# Patient Record
Sex: FEMALE | Race: WHITE | HISPANIC OR LATINO | URBAN - METROPOLITAN AREA
[De-identification: names, ages, dates, MRNs, and addresses within clinical notes are randomized per-mention and may not be internally consistent; named-entity substitution may affect disease eponyms.]

---

## 2019-02-21 ENCOUNTER — APPOINTMENT (OUTPATIENT)
Age: 44
Setting detail: DERMATOLOGY
End: 2019-02-21

## 2019-02-21 DIAGNOSIS — F42.4 EXCORIATION (SKIN-PICKING) DISORDER: ICD-10-CM

## 2019-02-21 DIAGNOSIS — L29.8 OTHER PRURITUS: ICD-10-CM

## 2019-02-21 DIAGNOSIS — L85.3 XEROSIS CUTIS: ICD-10-CM

## 2019-02-21 DIAGNOSIS — L20.89 OTHER ATOPIC DERMATITIS: ICD-10-CM

## 2019-02-21 PROBLEM — S10.80XA UNSPECIFIED SUPERFICIAL INJURY OF OTHER SPECIFIED PART OF NECK, INITIAL ENCOUNTER: Status: ACTIVE | Noted: 2019-02-21

## 2019-02-21 PROBLEM — T14.8XXA OTHER INJURY OF UNSPECIFIED BODY REGION, INITIAL ENCOUNTER: Status: ACTIVE | Noted: 2019-02-21

## 2019-02-21 PROBLEM — S40.911A UNSPECIFIED SUPERFICIAL INJURY OF RIGHT SHOULDER, INITIAL ENCOUNTER: Status: ACTIVE | Noted: 2019-02-21

## 2019-02-21 PROBLEM — L20.84 INTRINSIC (ALLERGIC) ECZEMA: Status: ACTIVE | Noted: 2019-02-21

## 2019-02-21 PROCEDURE — OTHER TREATMENT REGIMEN: OTHER

## 2019-02-21 PROCEDURE — OTHER MIPS QUALITY: OTHER

## 2019-02-21 PROCEDURE — OTHER FOLLOW UP FOR NEXT VISIT: OTHER

## 2019-02-21 PROCEDURE — 99203 OFFICE O/P NEW LOW 30 MIN: CPT

## 2019-02-21 PROCEDURE — OTHER COUNSELING: OTHER

## 2019-02-21 PROCEDURE — OTHER PRESCRIPTION: OTHER

## 2019-02-21 RX ORDER — TRIAMCINOLONE ACETONIDE 1 MG/G
OINTMENT TOPICAL
Qty: 1 | Refills: 0

## 2019-02-21 ASSESSMENT — LOCATION SIMPLE DESCRIPTION DERM
LOCATION SIMPLE: RIGHT UPPER ARM
LOCATION SIMPLE: RIGHT SHOULDER
LOCATION SIMPLE: RIGHT ANTERIOR NECK
LOCATION SIMPLE: RIGHT HAND
LOCATION SIMPLE: LEFT HAND
LOCATION SIMPLE: LEFT FOREARM
LOCATION SIMPLE: RIGHT FOREARM
LOCATION SIMPLE: RIGHT CLAVICULAR SKIN
LOCATION SIMPLE: LEFT WRIST

## 2019-02-21 ASSESSMENT — LOCATION DETAILED DESCRIPTION DERM
LOCATION DETAILED: RIGHT CLAVICULAR SKIN
LOCATION DETAILED: RIGHT RADIAL DORSAL HAND
LOCATION DETAILED: RIGHT ANTERIOR PROXIMAL UPPER ARM
LOCATION DETAILED: RIGHT ANTERIOR SHOULDER
LOCATION DETAILED: RIGHT PROXIMAL DORSAL FOREARM
LOCATION DETAILED: RIGHT CLAVICULAR NECK
LOCATION DETAILED: LEFT DORSAL MIDDLE METACARPOPHALANGEAL JOINT
LOCATION DETAILED: LEFT DORSAL WRIST
LOCATION DETAILED: LEFT PROXIMAL DORSAL FOREARM

## 2019-02-21 ASSESSMENT — LOCATION ZONE DERM
LOCATION ZONE: HAND
LOCATION ZONE: ARM
LOCATION ZONE: NECK
LOCATION ZONE: TRUNK

## 2019-03-21 ENCOUNTER — APPOINTMENT (OUTPATIENT)
Age: 44
Setting detail: DERMATOLOGY
End: 2019-03-21

## 2019-03-21 DIAGNOSIS — L85.3 XEROSIS CUTIS: ICD-10-CM

## 2019-03-21 DIAGNOSIS — L20.89 OTHER ATOPIC DERMATITIS: ICD-10-CM

## 2019-03-21 DIAGNOSIS — F42.4 EXCORIATION (SKIN-PICKING) DISORDER: ICD-10-CM

## 2019-03-21 DIAGNOSIS — L29.8 OTHER PRURITUS: ICD-10-CM

## 2019-03-21 PROBLEM — S10.80XA UNSPECIFIED SUPERFICIAL INJURY OF OTHER SPECIFIED PART OF NECK, INITIAL ENCOUNTER: Status: ACTIVE | Noted: 2019-03-21

## 2019-03-21 PROBLEM — L20.84 INTRINSIC (ALLERGIC) ECZEMA: Status: ACTIVE | Noted: 2019-03-21

## 2019-03-21 PROBLEM — S40.911A UNSPECIFIED SUPERFICIAL INJURY OF RIGHT SHOULDER, INITIAL ENCOUNTER: Status: ACTIVE | Noted: 2019-03-21

## 2019-03-21 PROBLEM — T14.8XXA OTHER INJURY OF UNSPECIFIED BODY REGION, INITIAL ENCOUNTER: Status: ACTIVE | Noted: 2019-03-21

## 2019-03-21 PROCEDURE — OTHER MIPS QUALITY: OTHER

## 2019-03-21 PROCEDURE — OTHER COUNSELING: OTHER

## 2019-03-21 PROCEDURE — 99213 OFFICE O/P EST LOW 20 MIN: CPT

## 2019-03-21 PROCEDURE — OTHER TREATMENT REGIMEN: OTHER

## 2019-03-21 PROCEDURE — OTHER FOLLOW UP FOR NEXT VISIT: OTHER

## 2019-03-21 ASSESSMENT — LOCATION DETAILED DESCRIPTION DERM
LOCATION DETAILED: RIGHT ANTERIOR SHOULDER
LOCATION DETAILED: LEFT DORSAL MIDDLE METACARPOPHALANGEAL JOINT
LOCATION DETAILED: RIGHT CLAVICULAR SKIN
LOCATION DETAILED: RIGHT CLAVICULAR NECK
LOCATION DETAILED: RIGHT ANTERIOR PROXIMAL UPPER ARM
LOCATION DETAILED: LEFT ELBOW
LOCATION DETAILED: RIGHT RADIAL DORSAL HAND
LOCATION DETAILED: RIGHT ELBOW
LOCATION DETAILED: RIGHT PROXIMAL DORSAL FOREARM
LOCATION DETAILED: LEFT PROXIMAL DORSAL FOREARM
LOCATION DETAILED: LEFT DORSAL WRIST

## 2019-03-21 ASSESSMENT — LOCATION SIMPLE DESCRIPTION DERM
LOCATION SIMPLE: RIGHT ANTERIOR NECK
LOCATION SIMPLE: RIGHT CLAVICULAR SKIN
LOCATION SIMPLE: LEFT ELBOW
LOCATION SIMPLE: RIGHT ELBOW
LOCATION SIMPLE: RIGHT SHOULDER
LOCATION SIMPLE: RIGHT UPPER ARM
LOCATION SIMPLE: RIGHT FOREARM
LOCATION SIMPLE: LEFT WRIST
LOCATION SIMPLE: LEFT HAND
LOCATION SIMPLE: LEFT FOREARM
LOCATION SIMPLE: RIGHT HAND

## 2019-03-21 ASSESSMENT — LOCATION ZONE DERM
LOCATION ZONE: HAND
LOCATION ZONE: TRUNK
LOCATION ZONE: ARM
LOCATION ZONE: NECK

## 2019-03-21 NOTE — PROCEDURE: TREATMENT REGIMEN
Plan: Fragrance free soaps\\nLimit showers to 5 min or less, luke warm water\\nLimit unattended time in bathroom
Detail Level: Zone
Continue Regimen: Fragrance free soaps\\nLimit showers to 5 min or less, luke warm water
Initiate Treatment: Mupirocin to open sores BID until healed\\nTriamcinolone ointment to right chest and arm BID Monday, Wednesday and Friday \\nEucerin Advanced moisturizer daily
Plan: Per Pt’s sister, pt scratches consistently. Advised to keep covered 24 hrs\\nPt can use the 3M Ultra sensitive tape (does not cause irritation)\\nCloth gloves at night on top of Aquaphor
Continue Regimen: Mupirocin to all open sores BID until healed\\nTriamcinolone ointment BID, Monday, Wednesday and Friday \\nEucerin Advanced moisturizer, chest, arms and hands in AM\\nAquaphor over forearms and hands, covered with gloves nightly (hands)

## 2019-03-21 NOTE — PROCEDURE: FOLLOW UP FOR NEXT VISIT
Detail Level: Simple
Instructions (Optional): Reevaluate
Instructions (Optional): Reevaluate treatment
Scheduled For Follow Up In (Optional): 6 weeks
Scheduled For Follow Up In (Optional): 1 month

## 2019-05-02 ENCOUNTER — APPOINTMENT (OUTPATIENT)
Age: 44
Setting detail: DERMATOLOGY
End: 2019-05-02

## 2019-05-02 DIAGNOSIS — L85.3 XEROSIS CUTIS: ICD-10-CM

## 2019-05-02 DIAGNOSIS — L29.8 OTHER PRURITUS: ICD-10-CM

## 2019-05-02 DIAGNOSIS — F42.4 EXCORIATION (SKIN-PICKING) DISORDER: ICD-10-CM

## 2019-05-02 DIAGNOSIS — L20.89 OTHER ATOPIC DERMATITIS: ICD-10-CM

## 2019-05-02 PROBLEM — S40.911A UNSPECIFIED SUPERFICIAL INJURY OF RIGHT SHOULDER, INITIAL ENCOUNTER: Status: ACTIVE | Noted: 2019-05-02

## 2019-05-02 PROBLEM — L20.84 INTRINSIC (ALLERGIC) ECZEMA: Status: ACTIVE | Noted: 2019-05-02

## 2019-05-02 PROBLEM — T14.8XXA OTHER INJURY OF UNSPECIFIED BODY REGION, INITIAL ENCOUNTER: Status: ACTIVE | Noted: 2019-05-02

## 2019-05-02 PROBLEM — S10.80XA UNSPECIFIED SUPERFICIAL INJURY OF OTHER SPECIFIED PART OF NECK, INITIAL ENCOUNTER: Status: ACTIVE | Noted: 2019-05-02

## 2019-05-02 PROCEDURE — OTHER FOLLOW UP FOR NEXT VISIT: OTHER

## 2019-05-02 PROCEDURE — 99214 OFFICE O/P EST MOD 30 MIN: CPT

## 2019-05-02 PROCEDURE — OTHER MIPS QUALITY: OTHER

## 2019-05-02 PROCEDURE — OTHER COUNSELING: OTHER

## 2019-05-02 PROCEDURE — OTHER TREATMENT REGIMEN: OTHER

## 2019-05-02 ASSESSMENT — LOCATION DETAILED DESCRIPTION DERM
LOCATION DETAILED: RIGHT RADIAL DORSAL HAND
LOCATION DETAILED: LEFT ELBOW
LOCATION DETAILED: RIGHT ANTERIOR SHOULDER
LOCATION DETAILED: RIGHT CLAVICULAR SKIN
LOCATION DETAILED: RIGHT ELBOW
LOCATION DETAILED: LEFT DORSAL WRIST
LOCATION DETAILED: RIGHT ANTERIOR PROXIMAL UPPER ARM
LOCATION DETAILED: RIGHT CLAVICULAR NECK
LOCATION DETAILED: RIGHT PROXIMAL DORSAL FOREARM
LOCATION DETAILED: LEFT DORSAL MIDDLE METACARPOPHALANGEAL JOINT
LOCATION DETAILED: LEFT PROXIMAL DORSAL FOREARM

## 2019-05-02 ASSESSMENT — LOCATION SIMPLE DESCRIPTION DERM
LOCATION SIMPLE: LEFT WRIST
LOCATION SIMPLE: LEFT ELBOW
LOCATION SIMPLE: RIGHT FOREARM
LOCATION SIMPLE: RIGHT UPPER ARM
LOCATION SIMPLE: LEFT FOREARM
LOCATION SIMPLE: RIGHT SHOULDER
LOCATION SIMPLE: RIGHT CLAVICULAR SKIN
LOCATION SIMPLE: LEFT HAND
LOCATION SIMPLE: RIGHT ANTERIOR NECK
LOCATION SIMPLE: RIGHT HAND
LOCATION SIMPLE: RIGHT ELBOW

## 2019-05-02 ASSESSMENT — LOCATION ZONE DERM
LOCATION ZONE: ARM
LOCATION ZONE: NECK
LOCATION ZONE: TRUNK
LOCATION ZONE: HAND

## 2019-05-02 NOTE — PROCEDURE: TREATMENT REGIMEN
Detail Level: Zone
Continue Regimen: Mupirocin to all open sores BID until healed\\nTriamcinolone ointment BID, Monday, Wednesday and Friday \\nEucerin Advanced moisturizer, chest, arms and hands in AM\\nAquaphor over forearms and hands, covered with gloves nightly (hands)
Plan: Fragrance free soaps\\nLimit showers to 5 min or less, luke warm water\\nLimit unattended time in bathroom
Plan: Per Pt’s sister, pt scratches consistently. Advised to keep covered 24 hrs\\nPt can use the 3M Ultra sensitive tape (does not cause irritation)\\nCloth gloves at night on top of Aquaphor\\nPt has improved significantly, will plan to continue treatment
Continue Regimen: Fragrance free soaps\\nLimit showers to 5 min or less, luke warm water\\nMupirocin to open sores BID until healed\\nTriamcinolone ointment to right chest and arm BID Monday, Wednesday and Friday \\nEucerin Advanced moisturizer daily

## 2019-05-02 NOTE — PROCEDURE: FOLLOW UP FOR NEXT VISIT
Instructions (Optional): Reevaluate treatment
Scheduled For Follow Up In (Optional): 3 months
Instructions (Optional): Reevaluate
Detail Level: Simple
Scheduled For Follow Up In (Optional): 3 month

## 2019-08-01 ENCOUNTER — APPOINTMENT (OUTPATIENT)
Age: 44
Setting detail: DERMATOLOGY
End: 2019-08-01

## 2019-08-01 DIAGNOSIS — L20.89 OTHER ATOPIC DERMATITIS: ICD-10-CM

## 2019-08-01 DIAGNOSIS — L29.8 OTHER PRURITUS: ICD-10-CM

## 2019-08-01 DIAGNOSIS — F42.4 EXCORIATION (SKIN-PICKING) DISORDER: ICD-10-CM

## 2019-08-01 DIAGNOSIS — L85.3 XEROSIS CUTIS: ICD-10-CM

## 2019-08-01 PROBLEM — J45.909 UNSPECIFIED ASTHMA, UNCOMPLICATED: Status: ACTIVE | Noted: 2019-08-01

## 2019-08-01 PROBLEM — S10.80XA UNSPECIFIED SUPERFICIAL INJURY OF OTHER SPECIFIED PART OF NECK, INITIAL ENCOUNTER: Status: ACTIVE | Noted: 2019-08-01

## 2019-08-01 PROBLEM — L20.84 INTRINSIC (ALLERGIC) ECZEMA: Status: ACTIVE | Noted: 2019-08-01

## 2019-08-01 PROBLEM — S40.911A UNSPECIFIED SUPERFICIAL INJURY OF RIGHT SHOULDER, INITIAL ENCOUNTER: Status: ACTIVE | Noted: 2019-08-01

## 2019-08-01 PROBLEM — T14.8XXA OTHER INJURY OF UNSPECIFIED BODY REGION, INITIAL ENCOUNTER: Status: ACTIVE | Noted: 2019-08-01

## 2019-08-01 PROCEDURE — OTHER TREATMENT REGIMEN: OTHER

## 2019-08-01 PROCEDURE — 99213 OFFICE O/P EST LOW 20 MIN: CPT

## 2019-08-01 PROCEDURE — OTHER MIPS QUALITY: OTHER

## 2019-08-01 PROCEDURE — OTHER COUNSELING: OTHER

## 2019-08-01 PROCEDURE — OTHER FOLLOW UP FOR NEXT VISIT: OTHER

## 2019-08-01 ASSESSMENT — LOCATION DETAILED DESCRIPTION DERM
LOCATION DETAILED: RIGHT CLAVICULAR NECK
LOCATION DETAILED: LEFT DORSAL MIDDLE METACARPOPHALANGEAL JOINT
LOCATION DETAILED: RIGHT ANTERIOR PROXIMAL UPPER ARM
LOCATION DETAILED: LEFT DORSAL WRIST
LOCATION DETAILED: RIGHT ANTERIOR SHOULDER
LOCATION DETAILED: RIGHT ELBOW
LOCATION DETAILED: RIGHT PROXIMAL DORSAL FOREARM
LOCATION DETAILED: LEFT ELBOW
LOCATION DETAILED: RIGHT RADIAL DORSAL HAND
LOCATION DETAILED: RIGHT CLAVICULAR SKIN
LOCATION DETAILED: LEFT PROXIMAL DORSAL FOREARM

## 2019-08-01 ASSESSMENT — LOCATION SIMPLE DESCRIPTION DERM
LOCATION SIMPLE: RIGHT FOREARM
LOCATION SIMPLE: LEFT ELBOW
LOCATION SIMPLE: RIGHT CLAVICULAR SKIN
LOCATION SIMPLE: RIGHT ELBOW
LOCATION SIMPLE: LEFT WRIST
LOCATION SIMPLE: RIGHT ANTERIOR NECK
LOCATION SIMPLE: LEFT HAND
LOCATION SIMPLE: RIGHT SHOULDER
LOCATION SIMPLE: RIGHT HAND
LOCATION SIMPLE: LEFT FOREARM
LOCATION SIMPLE: RIGHT UPPER ARM

## 2019-08-01 ASSESSMENT — LOCATION ZONE DERM
LOCATION ZONE: ARM
LOCATION ZONE: NECK
LOCATION ZONE: TRUNK
LOCATION ZONE: HAND

## 2019-08-01 NOTE — PROCEDURE: FOLLOW UP FOR NEXT VISIT
Instructions (Optional): Reevaluate
Scheduled For Follow Up In (Optional): 6 months
Instructions (Optional): Reevaluate treatment
Detail Level: Simple

## 2019-08-01 NOTE — PROCEDURE: TREATMENT REGIMEN
Continue Regimen: Fragrance free soaps\\nLimit showers to 5 min or less, luke warm water\\nMupirocin to open sores BID until healed\\nTriamcinolone ointment to right chest and arm BID Monday, Wednesday and Friday \\nEucerin Advanced moisturizer daily
Detail Level: Zone
Plan: Per Pt’s sister, pt scratches consistently. Advised to keep covered 24 hrs\\nPt can use the 3M Ultra sensitive tape (does not cause irritation)\\nCloth gloves at night on top of Aquaphor\\nPt has improved significantly, will plan to continue treatment
Continue Regimen: Mupirocin to all open sores BID until healed\\nTriamcinolone ointment BID, Monday, Wednesday and Friday \\nEucerin Advanced moisturizer, chest, arms and hands in AM\\nAquaphor over forearms and hands, covered with gloves nightly (hands)
Plan: Fragrance free soaps\\nLimit showers to 5 min or less, luke warm water\\nLimit unattended time in bathroom

## 2019-10-28 NOTE — PROCEDURE: TREATMENT REGIMEN
Detail Level: Zone
Plan: Per Pt’s sister, pt scratches consistently. Advised to keep covered 24 hrs\\nPt can use the 3M Ultra sensitive tape (does not cause irritation)\\nCloth gloves at night on top of moisturizer
Continue Regimen: Fragrance free soaps\\nLimit showers to 5 min or less, luke warm water
Initiate Treatment: Mupirocin to open sores BID until healed\\nTriamcinolone ointment 2 weeks BID out of the month\\nEucerin Advanced moisturizer daily
43.3

## 2019-12-05 ENCOUNTER — APPOINTMENT (OUTPATIENT)
Age: 44
Setting detail: DERMATOLOGY
End: 2019-12-05

## 2019-12-05 DIAGNOSIS — L85.3 XEROSIS CUTIS: ICD-10-CM

## 2019-12-05 DIAGNOSIS — L20.89 OTHER ATOPIC DERMATITIS: ICD-10-CM

## 2019-12-05 DIAGNOSIS — L29.8 OTHER PRURITUS: ICD-10-CM

## 2019-12-05 DIAGNOSIS — L08.9 LOCAL INFECTION OF THE SKIN AND SUBCUTANEOUS TISSUE, UNSPECIFIED: ICD-10-CM

## 2019-12-05 PROBLEM — L20.84 INTRINSIC (ALLERGIC) ECZEMA: Status: ACTIVE | Noted: 2019-12-05

## 2019-12-05 PROBLEM — M12.9 ARTHROPATHY, UNSPECIFIED: Status: ACTIVE | Noted: 2019-12-05

## 2019-12-05 PROCEDURE — OTHER PRESCRIPTION: OTHER

## 2019-12-05 PROCEDURE — OTHER TREATMENT REGIMEN: OTHER

## 2019-12-05 PROCEDURE — 99213 OFFICE O/P EST LOW 20 MIN: CPT

## 2019-12-05 PROCEDURE — OTHER COUNSELING: OTHER

## 2019-12-05 PROCEDURE — OTHER MIPS QUALITY: OTHER

## 2019-12-05 RX ORDER — MUPIROCIN 20 MG/G
OINTMENT TOPICAL
Qty: 1 | Refills: 2 | COMMUNITY
Start: 2019-12-05

## 2019-12-05 ASSESSMENT — LOCATION ZONE DERM
LOCATION ZONE: HAND
LOCATION ZONE: ARM

## 2019-12-05 ASSESSMENT — LOCATION DETAILED DESCRIPTION DERM
LOCATION DETAILED: RIGHT ANTERIOR SHOULDER
LOCATION DETAILED: RIGHT RADIAL DORSAL HAND
LOCATION DETAILED: LEFT ELBOW
LOCATION DETAILED: LEFT PROXIMAL DORSAL FOREARM
LOCATION DETAILED: LEFT DORSAL MIDDLE METACARPOPHALANGEAL JOINT
LOCATION DETAILED: LEFT DORSAL WRIST
LOCATION DETAILED: RIGHT ELBOW
LOCATION DETAILED: RIGHT PROXIMAL DORSAL FOREARM
LOCATION DETAILED: RIGHT ANTERIOR PROXIMAL UPPER ARM

## 2019-12-05 ASSESSMENT — LOCATION SIMPLE DESCRIPTION DERM
LOCATION SIMPLE: RIGHT HAND
LOCATION SIMPLE: RIGHT ELBOW
LOCATION SIMPLE: LEFT ELBOW
LOCATION SIMPLE: LEFT HAND
LOCATION SIMPLE: RIGHT UPPER ARM
LOCATION SIMPLE: LEFT FOREARM
LOCATION SIMPLE: LEFT WRIST
LOCATION SIMPLE: RIGHT SHOULDER
LOCATION SIMPLE: RIGHT FOREARM

## 2019-12-05 NOTE — PROCEDURE: TREATMENT REGIMEN
Initiate Treatment: Mupirocin to right forearm twice x3 weeks, change bandage at each application. Dressing should be with non adherent pad and wrapped in coband .  Right forearm should be dressed 24 hours a day.
Detail Level: Zone
Continue Regimen: Mupirocin to all open sores BID until healed\\nTriamcinolone ointment BID, Monday, Wednesday and Friday to all UNOPENED wounds \\nEucerin Advanced moisturizer, chest, arms and hands in AM\\nAquaphor over forearms and hands, covered with gloves nightly (hands)
Plan: Fragrance free soaps\\nLimit showers to 5 min or less, luke warm water\\nLimit unattended time in bathroom
Continue Regimen: Fragrance free soaps\\nLimit showers to 5 min or less, luke warm water\\nMupirocin to open sores BID until healed\\nTriamcinolone ointment to right chest and arm BID Monday, Wednesday and Friday to UNOPENED lesions\\nEucerin Advanced moisturizer daily
Plan: Per Pt’s sister, pt scratches consistently. Advised to keep covered 24 hrs\\nPt can use the 3M Ultra sensitive tape (does not cause irritation)\\nCloth gloves at night on top of Aquaphor\\nPt has improved significantly, will plan to continue treatment

## 2020-01-16 ENCOUNTER — APPOINTMENT (OUTPATIENT)
Age: 45
Setting detail: DERMATOLOGY
End: 2020-01-16

## 2020-01-16 DIAGNOSIS — L08.9 LOCAL INFECTION OF THE SKIN AND SUBCUTANEOUS TISSUE, UNSPECIFIED: ICD-10-CM

## 2020-01-16 DIAGNOSIS — L20.89 OTHER ATOPIC DERMATITIS: ICD-10-CM

## 2020-01-16 DIAGNOSIS — L85.3 XEROSIS CUTIS: ICD-10-CM

## 2020-01-16 PROBLEM — L20.84 INTRINSIC (ALLERGIC) ECZEMA: Status: ACTIVE | Noted: 2020-01-16

## 2020-01-16 PROCEDURE — 99213 OFFICE O/P EST LOW 20 MIN: CPT

## 2020-01-16 PROCEDURE — OTHER MIPS QUALITY: OTHER

## 2020-01-16 PROCEDURE — OTHER TREATMENT REGIMEN: OTHER

## 2020-01-16 PROCEDURE — OTHER COUNSELING: OTHER

## 2020-01-16 PROCEDURE — OTHER FOLLOW UP FOR NEXT VISIT: OTHER

## 2020-01-16 ASSESSMENT — LOCATION SIMPLE DESCRIPTION DERM
LOCATION SIMPLE: RIGHT UPPER ARM
LOCATION SIMPLE: LEFT WRIST
LOCATION SIMPLE: RIGHT SHOULDER
LOCATION SIMPLE: LEFT HAND
LOCATION SIMPLE: LEFT ELBOW
LOCATION SIMPLE: RIGHT FOREARM
LOCATION SIMPLE: RIGHT HAND
LOCATION SIMPLE: LEFT FOREARM
LOCATION SIMPLE: RIGHT ELBOW

## 2020-01-16 ASSESSMENT — LOCATION DETAILED DESCRIPTION DERM
LOCATION DETAILED: LEFT DORSAL WRIST
LOCATION DETAILED: RIGHT ANTERIOR PROXIMAL UPPER ARM
LOCATION DETAILED: RIGHT PROXIMAL DORSAL FOREARM
LOCATION DETAILED: RIGHT RADIAL DORSAL HAND
LOCATION DETAILED: LEFT ELBOW
LOCATION DETAILED: RIGHT ELBOW
LOCATION DETAILED: LEFT DORSAL MIDDLE METACARPOPHALANGEAL JOINT
LOCATION DETAILED: LEFT PROXIMAL DORSAL FOREARM
LOCATION DETAILED: RIGHT ANTERIOR SHOULDER

## 2020-01-16 ASSESSMENT — LOCATION ZONE DERM
LOCATION ZONE: ARM
LOCATION ZONE: HAND

## 2020-01-16 NOTE — PROCEDURE: TREATMENT REGIMEN
Initiate Treatment: Mupirocin to right forearm twice x3 weeks, change bandage at each application. Dressing should be with non adherent pad and wrapped in coban .  Right forearm should be dressed 24 hours a day.
Plan: Fragrance free soaps\\nLimit showers to 5 min or less, luke warm water\\nLimit unattended time in bathroom\\nIf sores still present at next visit, may recommend clothing sleeves.
Continue Regimen: Fragrance free soaps\\nLimit showers to 5 min or less, luke warm water\\nMupirocin to open sores BID until healed\\nTriamcinolone ointment to right chest and arm BID Monday, Wednesday and Friday to UNOPENED lesions\\nEucerin Advanced moisturizer daily
Detail Level: Zone
Continue Regimen: * Mupirocin to all open sores BID until healed, dressing with non adhesive pad, then wrap with 2 in.    Coban. Can discontinue once wounds are healed. \\n* Triamcinolone ointment BID, Monday, Wednesday and Friday to all UNOPENED wounds\\n* Eucerin Advanced moisturizer, chest, arms and hands in AM\\n* Aquaphor over forearms and hands, covered with gloves nightly (hands)\\n\\nThe above order is maintenance therapy and is not to be discontinued without ordering provider’s consent.

## 2020-01-16 NOTE — PROCEDURE: FOLLOW UP FOR NEXT VISIT
Scheduled For Follow Up In (Optional): 4 weeks
Scheduled For Follow Up In (Optional): 1 month
Instructions (Optional): Reevaluate
Detail Level: Simple

## 2020-01-21 ENCOUNTER — RX ONLY (RX ONLY)
Age: 45
End: 2020-01-21

## 2020-01-21 RX ORDER — TRIAMCINOLONE ACETONIDE 1 MG/G
OINTMENT TOPICAL
Qty: 1 | Refills: 0 | Status: ERX

## 2020-03-02 ENCOUNTER — APPOINTMENT (OUTPATIENT)
Age: 45
Setting detail: DERMATOLOGY
End: 2020-03-02

## 2020-03-02 DIAGNOSIS — L98429 CHRONIC ULCER OF OTHER SPECIFIED SITES: ICD-10-CM

## 2020-03-02 DIAGNOSIS — L98419 CHRONIC ULCER OF OTHER SPECIFIED SITES: ICD-10-CM

## 2020-03-02 PROBLEM — L98.499 NON-PRESSURE CHRONIC ULCER OF SKIN OF OTHER SITES WITH UNSPECIFIED SEVERITY: Status: ACTIVE | Noted: 2020-03-02

## 2020-03-02 PROCEDURE — OTHER MIPS QUALITY: OTHER

## 2020-03-02 PROCEDURE — OTHER COUNSELING: OTHER

## 2020-03-02 PROCEDURE — 99213 OFFICE O/P EST LOW 20 MIN: CPT

## 2020-03-02 PROCEDURE — OTHER TREATMENT REGIMEN: OTHER

## 2020-03-02 ASSESSMENT — LOCATION SIMPLE DESCRIPTION DERM: LOCATION SIMPLE: RIGHT FOREARM

## 2020-03-02 ASSESSMENT — LOCATION ZONE DERM: LOCATION ZONE: ARM

## 2020-03-02 ASSESSMENT — LOCATION DETAILED DESCRIPTION DERM: LOCATION DETAILED: RIGHT VENTRAL PROXIMAL FOREARM

## 2020-03-02 NOTE — PROCEDURE: TREATMENT REGIMEN
Detail Level: Zone
Plan: Mupirocin to right forearm QD, change bandage at each application. Dressing should be with non adherent pad and wrapped in coban . Right forearm should be dressed 24 hours a day.

## 2020-03-11 ENCOUNTER — RX ONLY (RX ONLY)
Age: 45
End: 2020-03-11

## 2020-03-11 RX ORDER — MUPIROCIN 20 MG/G
OINTMENT TOPICAL
Qty: 1 | Refills: 2 | Status: ERX

## 2020-03-11 RX ORDER — TRIAMCINOLONE ACETONIDE 1 MG/G
OINTMENT TOPICAL
Qty: 1 | Refills: 0 | Status: ERX

## 2020-03-12 ENCOUNTER — APPOINTMENT (OUTPATIENT)
Age: 45
Setting detail: DERMATOLOGY
End: 2020-03-12

## 2020-03-12 DIAGNOSIS — L98429 CHRONIC ULCER OF OTHER SPECIFIED SITES: ICD-10-CM

## 2020-03-12 DIAGNOSIS — L98419 CHRONIC ULCER OF OTHER SPECIFIED SITES: ICD-10-CM

## 2020-03-12 PROBLEM — L98.499 NON-PRESSURE CHRONIC ULCER OF SKIN OF OTHER SITES WITH UNSPECIFIED SEVERITY: Status: ACTIVE | Noted: 2020-03-12

## 2020-03-12 PROCEDURE — OTHER COUNSELING: OTHER

## 2020-03-12 PROCEDURE — 99213 OFFICE O/P EST LOW 20 MIN: CPT

## 2020-03-12 PROCEDURE — OTHER TREATMENT REGIMEN: OTHER

## 2020-03-12 PROCEDURE — OTHER MIPS QUALITY: OTHER

## 2020-03-12 ASSESSMENT — LOCATION DETAILED DESCRIPTION DERM: LOCATION DETAILED: RIGHT VENTRAL PROXIMAL FOREARM

## 2020-03-12 ASSESSMENT — LOCATION SIMPLE DESCRIPTION DERM: LOCATION SIMPLE: RIGHT FOREARM

## 2020-03-12 ASSESSMENT — LOCATION ZONE DERM: LOCATION ZONE: ARM

## 2020-03-12 NOTE — PROCEDURE: TREATMENT REGIMEN
Detail Level: Zone
Continue Regimen: Mupirocin oint\\nApply to open area on forearm. \\n\\nStrongly recommended keeping the arm wrapped to try and avoid any picking. Neoprene sleeve over the Mupirocin covered ulcer.
Plan: Mupirocin to right forearm QD, change bandage at each application. Dressing should be with non adherent pad and wrapped in coban . Right forearm should be dressed 24 hours a day.

## 2020-03-12 NOTE — PROCEDURE: MIPS QUALITY
Detail Level: Detailed
Quality 110: Preventive Care And Screening: Influenza Immunization: Influenza Immunization Administered during Influenza season
Quality 226: Preventive Care And Screening: Tobacco Use: Screening And Cessation Intervention: Patient screened for tobacco use and is an ex/non-smoker
06-Dec-2018 11:16

## 2023-04-17 ENCOUNTER — APPOINTMENT (OUTPATIENT)
Age: 48
Setting detail: DERMATOLOGY
End: 2023-04-17

## 2023-04-17 VITALS — TEMPERATURE: 98.7 F

## 2023-04-17 DIAGNOSIS — L65.0 TELOGEN EFFLUVIUM: ICD-10-CM

## 2023-04-17 DIAGNOSIS — L85.3 XEROSIS CUTIS: ICD-10-CM

## 2023-04-17 DIAGNOSIS — L28.0 LICHEN SIMPLEX CHRONICUS: ICD-10-CM

## 2023-04-17 DIAGNOSIS — F42.4 EXCORIATION (SKIN-PICKING) DISORDER: ICD-10-CM

## 2023-04-17 PROBLEM — L65.9 NONSCARRING HAIR LOSS, UNSPECIFIED: Status: ACTIVE | Noted: 2023-04-17

## 2023-04-17 PROCEDURE — OTHER PRESCRIPTION: OTHER

## 2023-04-17 PROCEDURE — OTHER PRESCRIPTION MEDICATION MANAGEMENT: OTHER

## 2023-04-17 PROCEDURE — OTHER ADDITIONAL NOTES: OTHER

## 2023-04-17 PROCEDURE — OTHER MIPS QUALITY: OTHER

## 2023-04-17 PROCEDURE — 99213 OFFICE O/P EST LOW 20 MIN: CPT

## 2023-04-17 PROCEDURE — OTHER TREATMENT REGIMEN: OTHER

## 2023-04-17 PROCEDURE — OTHER COUNSELING: OTHER

## 2023-04-17 RX ORDER — MUPIROCIN 20 MG/G
OINTMENT TOPICAL
Qty: 44 | Refills: 0 | Status: ERX | COMMUNITY
Start: 2023-04-17

## 2023-04-17 ASSESSMENT — LOCATION SIMPLE DESCRIPTION DERM
LOCATION SIMPLE: RIGHT HAND
LOCATION SIMPLE: LEFT FOREARM
LOCATION SIMPLE: RIGHT FOREARM
LOCATION SIMPLE: LEFT HAND
LOCATION SIMPLE: ANTERIOR SCALP

## 2023-04-17 ASSESSMENT — LOCATION DETAILED DESCRIPTION DERM
LOCATION DETAILED: 2ND WEB SPACE RIGHT HAND
LOCATION DETAILED: LEFT DISTAL DORSAL FOREARM
LOCATION DETAILED: RIGHT PROXIMAL DORSAL FOREARM
LOCATION DETAILED: LEFT ULNAR DORSAL HAND
LOCATION DETAILED: RIGHT PROXIMAL RADIAL DORSAL FOREARM
LOCATION DETAILED: MID-FRONTAL SCALP

## 2023-04-17 ASSESSMENT — LOCATION ZONE DERM
LOCATION ZONE: HAND
LOCATION ZONE: ARM
LOCATION ZONE: SCALP

## 2023-04-17 NOTE — PROCEDURE: ADDITIONAL NOTES
Render Risk Assessment In Note?: no
Additional Notes: Patient's sister noticed significant thinning of the hair on scalp over the past 1-2 months, though she is unsure if patient is experiencing mostly shedding or just thinning of hair. We discussed having bloodwork drawn with PCP to check thyroid function, CBC, Vitamin D, Vitamin B12, ferritin.
Detail Level: Simple

## 2023-04-17 NOTE — PROCEDURE: PRESCRIPTION MEDICATION MANAGEMENT
Plan: Trim nails short to help prevent deep scratching
Render In Strict Bullet Format?: No
Initiate Treatment: Eucerin Advanced Repair cream daily to arms and hands; cover hands with cotton gloves at night before bed
Initiate Treatment: Mupirocin 2 % topical ointment - apply a thin layer to open wound on right arm bid x2 weeks or until healed
Detail Level: Zone

## 2023-04-17 NOTE — PROCEDURE: COUNSELING
Detail Level: Simple
Cleanser Recommendations: Dove unscented soap
Detail Level: Zone
Antihistamine Recommendations: Zyrtec/Claritin/Allegra daily
Moisturizer Recommendations: Eucerin Advanced Repair cream
Moisturizer Recommendations: Vaseline or Aquaphor BID
Antihistamine Recommendations: Benadryl

## 2023-04-17 NOTE — PROCEDURE: TREATMENT REGIMEN
Detail Level: Zone
Initiate Treatment: Daily moisturizing, triamcinolone cream to affected area BID x two weeks then PRN itching only

## 2023-05-17 ENCOUNTER — APPOINTMENT (OUTPATIENT)
Age: 48
Setting detail: DERMATOLOGY
End: 2023-05-17

## 2023-05-17 VITALS — TEMPERATURE: 98.7 F

## 2023-05-17 DIAGNOSIS — L65.0 TELOGEN EFFLUVIUM: ICD-10-CM

## 2023-05-17 DIAGNOSIS — L28.0 LICHEN SIMPLEX CHRONICUS: ICD-10-CM

## 2023-05-17 DIAGNOSIS — L663 OTHER SPECIFIED DISEASES OF HAIR AND HAIR FOLLICLES: ICD-10-CM

## 2023-05-17 DIAGNOSIS — L738 OTHER SPECIFIED DISEASES OF HAIR AND HAIR FOLLICLES: ICD-10-CM

## 2023-05-17 DIAGNOSIS — F42.4 EXCORIATION (SKIN-PICKING) DISORDER: ICD-10-CM

## 2023-05-17 PROBLEM — L65.9 NONSCARRING HAIR LOSS, UNSPECIFIED: Status: ACTIVE | Noted: 2023-05-17

## 2023-05-17 PROBLEM — L02.221 FURUNCLE OF ABDOMINAL WALL: Status: ACTIVE | Noted: 2023-05-17

## 2023-05-17 PROCEDURE — OTHER PRESCRIPTION: OTHER

## 2023-05-17 PROCEDURE — OTHER TREATMENT REGIMEN: OTHER

## 2023-05-17 PROCEDURE — OTHER PRESCRIPTION MEDICATION MANAGEMENT: OTHER

## 2023-05-17 PROCEDURE — OTHER MIPS QUALITY: OTHER

## 2023-05-17 PROCEDURE — OTHER ADDITIONAL NOTES: OTHER

## 2023-05-17 PROCEDURE — 99213 OFFICE O/P EST LOW 20 MIN: CPT

## 2023-05-17 PROCEDURE — OTHER COUNSELING: OTHER

## 2023-05-17 RX ORDER — CLINDAMYCIN PHOSPHATE AND BENZOYL PEROXIDE 1 %-5 %
KIT TOPICAL
Qty: 25 | Refills: 4 | Status: ERX | COMMUNITY
Start: 2023-05-17

## 2023-05-17 ASSESSMENT — LOCATION SIMPLE DESCRIPTION DERM
LOCATION SIMPLE: RIGHT FOREARM
LOCATION SIMPLE: ANTERIOR SCALP
LOCATION SIMPLE: GROIN

## 2023-05-17 ASSESSMENT — LOCATION DETAILED DESCRIPTION DERM
LOCATION DETAILED: RIGHT PROXIMAL RADIAL DORSAL FOREARM
LOCATION DETAILED: MONS PUBIS
LOCATION DETAILED: MID-FRONTAL SCALP

## 2023-05-17 ASSESSMENT — LOCATION ZONE DERM
LOCATION ZONE: VULVA
LOCATION ZONE: SCALP
LOCATION ZONE: ARM

## 2023-05-17 NOTE — PROCEDURE: ADDITIONAL NOTES
Render Risk Assessment In Note?: no
Additional Notes: Patient's sister stated today that patient's PCP faxed over lab results, but they have not been received. Correct fax number provided to patient's sister today; she will be contacted to discuss results once labs are received
Detail Level: Simple

## 2023-05-17 NOTE — PROCEDURE: PRESCRIPTION MEDICATION MANAGEMENT
Discontinue Regimen: Trimming pubic hair in groin area
Detail Level: Zone
Continue Regimen: Vaseline or Aquaphor to open wound on arm BID until healed, keeping nails trimmed short, wrapping arm with coban to help prevent picking
Initiate Treatment: Clindamycin 1 %-benzoyl peroxide 5 % topical gel - apply a thin layer to affected areas of folliculitis in groin QD- BID PRN; Hibiclens to wash groin area several times weekly - allow product to sit on skin for 5 minutes if patient tolerates
Render In Strict Bullet Format?: No

## 2023-05-17 NOTE — PROCEDURE: COUNSELING
Detail Level: Simple
Bpo Recommendations: Panoxyl 10% BPO wash
Moisturizer Recommendations: Vaseline or Aquaphor BID
Chlorhexidine Recommendations: Hibiclens
Antihistamine Recommendations: Benadryl
Detail Level: Zone
Antihistamine Recommendations: Zyrtec/Claritin/Allegra daily
Cleanser Recommendations: Dove unscented soap
Moisturizer Recommendations: Eucerin Advanced Repair cream

## 2023-05-17 NOTE — PROCEDURE: TREATMENT REGIMEN
Continue Regimen: Daily moisturizing, triamcinolone cream to affected area BID x two weeks followed by a one week break before repeating cycle while thickness persists
Detail Level: Zone

## 2025-04-28 ENCOUNTER — APPOINTMENT (OUTPATIENT)
Age: 50
Setting detail: DERMATOLOGY
End: 2025-04-28

## 2025-04-28 DIAGNOSIS — L21.8 OTHER SEBORRHEIC DERMATITIS: ICD-10-CM

## 2025-04-28 DIAGNOSIS — L24.9 IRRITANT CONTACT DERMATITIS, UNSPECIFIED CAUSE: ICD-10-CM

## 2025-04-28 PROBLEM — L30.9 DERMATITIS, UNSPECIFIED: Status: ACTIVE | Noted: 2025-04-28

## 2025-04-28 PROCEDURE — OTHER TREATMENT REGIMEN: OTHER

## 2025-04-28 PROCEDURE — OTHER PRESCRIPTION: OTHER

## 2025-04-28 PROCEDURE — OTHER COUNSELING: OTHER

## 2025-04-28 PROCEDURE — OTHER PRESCRIPTION MEDICATION MANAGEMENT: OTHER

## 2025-04-28 PROCEDURE — 99213 OFFICE O/P EST LOW 20 MIN: CPT

## 2025-04-28 RX ORDER — KETOCONAZOLE 20 MG/ML
SHAMPOO, SUSPENSION TOPICAL
Qty: 120 | Refills: 5 | Status: ERX | COMMUNITY
Start: 2025-04-28

## 2025-04-28 RX ORDER — PIMECROLIMUS 10 MG/G
CREAM TOPICAL
Qty: 100 | Refills: 2 | Status: ERX | COMMUNITY
Start: 2025-04-28

## 2025-04-28 ASSESSMENT — LOCATION DETAILED DESCRIPTION DERM
LOCATION DETAILED: RIGHT LOWER CUTANEOUS LIP
LOCATION DETAILED: LEFT SUPERIOR PARIETAL SCALP

## 2025-04-28 ASSESSMENT — LOCATION ZONE DERM
LOCATION ZONE: LIP
LOCATION ZONE: SCALP

## 2025-04-28 ASSESSMENT — LOCATION SIMPLE DESCRIPTION DERM
LOCATION SIMPLE: RIGHT LIP
LOCATION SIMPLE: SCALP

## 2025-04-28 NOTE — PROCEDURE: PRESCRIPTION MEDICATION MANAGEMENT
Initiate Treatment: Pimecrolimus 1 % topical cream - apply to affected area under lower lip twice daily
Render In Strict Bullet Format?: No
Detail Level: Zone
Initiate Treatment: Ketoconazole 2 % shampoo - lather on scalp, let soak 3- 5 min before rinsing, use at least 3 times a week until well controlled, then use 1-2x a week as maintenance

## 2025-04-28 NOTE — PROCEDURE: TREATMENT REGIMEN
Detail Level: Zone
Initiate Treatment: Vaseline or Aquaphor to affected area several times daily
Discontinue Regimen: Eagle area, rubbing area

## 2025-06-02 ENCOUNTER — APPOINTMENT (OUTPATIENT)
Age: 50
Setting detail: DERMATOLOGY
End: 2025-06-02

## 2025-06-02 DIAGNOSIS — L24.9 IRRITANT CONTACT DERMATITIS, UNSPECIFIED CAUSE: ICD-10-CM

## 2025-06-02 DIAGNOSIS — L81.8 OTHER SPECIFIED DISORDERS OF PIGMENTATION: ICD-10-CM

## 2025-06-02 DIAGNOSIS — L21.8 OTHER SEBORRHEIC DERMATITIS: ICD-10-CM

## 2025-06-02 PROBLEM — L30.9 DERMATITIS, UNSPECIFIED: Status: ACTIVE | Noted: 2025-06-02

## 2025-06-02 PROCEDURE — OTHER MIPS QUALITY: OTHER

## 2025-06-02 PROCEDURE — 99213 OFFICE O/P EST LOW 20 MIN: CPT

## 2025-06-02 PROCEDURE — OTHER PRESCRIPTION MEDICATION MANAGEMENT: OTHER

## 2025-06-02 PROCEDURE — OTHER COUNSELING: OTHER

## 2025-06-02 PROCEDURE — OTHER PRESCRIPTION: OTHER

## 2025-06-02 PROCEDURE — OTHER TREATMENT REGIMEN: OTHER

## 2025-06-02 RX ORDER — TACROLIMUS 0.3 MG/G
OINTMENT TOPICAL
Qty: 60 | Refills: 2 | Status: ERX | COMMUNITY
Start: 2025-06-02

## 2025-06-02 ASSESSMENT — LOCATION SIMPLE DESCRIPTION DERM
LOCATION SIMPLE: LEFT LIP
LOCATION SIMPLE: SCALP
LOCATION SIMPLE: RIGHT LIP

## 2025-06-02 ASSESSMENT — LOCATION DETAILED DESCRIPTION DERM
LOCATION DETAILED: LEFT SUPERIOR PARIETAL SCALP
LOCATION DETAILED: LEFT LOWER CUTANEOUS LIP
LOCATION DETAILED: RIGHT LOWER CUTANEOUS LIP

## 2025-06-02 ASSESSMENT — LOCATION ZONE DERM
LOCATION ZONE: LIP
LOCATION ZONE: SCALP

## 2025-07-09 ENCOUNTER — APPOINTMENT (OUTPATIENT)
Age: 50
Setting detail: DERMATOLOGY
End: 2025-07-10

## 2025-07-09 DIAGNOSIS — L24.9 IRRITANT CONTACT DERMATITIS, UNSPECIFIED CAUSE: ICD-10-CM

## 2025-07-09 DIAGNOSIS — L81.8 OTHER SPECIFIED DISORDERS OF PIGMENTATION: ICD-10-CM

## 2025-07-09 PROBLEM — L30.9 DERMATITIS, UNSPECIFIED: Status: ACTIVE | Noted: 2025-07-09

## 2025-07-09 PROCEDURE — OTHER PRESCRIPTION MEDICATION MANAGEMENT: OTHER

## 2025-07-09 PROCEDURE — 99213 OFFICE O/P EST LOW 20 MIN: CPT

## 2025-07-09 PROCEDURE — OTHER COUNSELING: OTHER

## 2025-07-09 PROCEDURE — OTHER PRESCRIPTION: OTHER

## 2025-07-09 PROCEDURE — OTHER MIPS QUALITY: OTHER

## 2025-07-09 PROCEDURE — OTHER TREATMENT REGIMEN: OTHER

## 2025-07-09 RX ORDER — DIABETIC SUPPLIES,MISCELL
EACH MISCELLANEOUS
Qty: 1 | Refills: 11

## 2025-07-09 RX ORDER — DIABETIC SUPPLIES,MISCELL
EACH MISCELLANEOUS
Qty: 1 | Refills: 11 | COMMUNITY
Start: 2025-07-09

## 2025-07-09 RX ORDER — SKIN CLEANSER COMB NO.42
CLEANSER (ML) TOPICAL
Qty: 473 | Refills: 11 | Status: ERX | COMMUNITY
Start: 2025-07-09

## 2025-07-09 ASSESSMENT — LOCATION SIMPLE DESCRIPTION DERM
LOCATION SIMPLE: LEFT LIP
LOCATION SIMPLE: RIGHT LIP

## 2025-07-09 ASSESSMENT — LOCATION DETAILED DESCRIPTION DERM
LOCATION DETAILED: RIGHT LOWER CUTANEOUS LIP
LOCATION DETAILED: LEFT LOWER CUTANEOUS LIP

## 2025-07-09 ASSESSMENT — LOCATION ZONE DERM: LOCATION ZONE: LIP
